# Patient Record
Sex: FEMALE | Race: WHITE | NOT HISPANIC OR LATINO | URBAN - METROPOLITAN AREA
[De-identification: names, ages, dates, MRNs, and addresses within clinical notes are randomized per-mention and may not be internally consistent; named-entity substitution may affect disease eponyms.]

---

## 2020-08-31 ENCOUNTER — APPOINTMENT (OUTPATIENT)
Dept: URBAN - METROPOLITAN AREA CLINIC 282 | Age: 30
Setting detail: DERMATOLOGY
End: 2020-09-01

## 2020-08-31 DIAGNOSIS — L50.5 CHOLINERGIC URTICARIA: ICD-10-CM

## 2020-08-31 PROCEDURE — OTHER COUNSELING: OTHER

## 2020-08-31 PROCEDURE — 99202 OFFICE O/P NEW SF 15 MIN: CPT

## 2020-08-31 PROCEDURE — OTHER TREATMENT REGIMEN: OTHER

## 2020-08-31 ASSESSMENT — LOCATION ZONE DERM: LOCATION ZONE: TRUNK

## 2020-08-31 ASSESSMENT — LOCATION DETAILED DESCRIPTION DERM: LOCATION DETAILED: UPPER STERNUM

## 2020-08-31 ASSESSMENT — LOCATION SIMPLE DESCRIPTION DERM: LOCATION SIMPLE: CHEST

## 2020-08-31 NOTE — PROCEDURE: TREATMENT REGIMEN
Otc Regimen: Alternate Claritin and Zyrtec 10mg daily
Detail Level: Simple
Plan: Gave acute vs chronic urticarial list of urticants.\\nPt believes they really mostly arise when hot & sweating\\nPt did do prick testing with an allergy doctor with unknown results.